# Patient Record
Sex: FEMALE | Race: WHITE | NOT HISPANIC OR LATINO | Employment: OTHER | URBAN - METROPOLITAN AREA
[De-identification: names, ages, dates, MRNs, and addresses within clinical notes are randomized per-mention and may not be internally consistent; named-entity substitution may affect disease eponyms.]

---

## 2024-05-20 ENCOUNTER — APPOINTMENT (OUTPATIENT)
Dept: AUDIOLOGY | Facility: CLINIC | Age: 69
End: 2024-05-20
Payer: COMMERCIAL

## 2024-06-10 ENCOUNTER — CLINICAL SUPPORT (OUTPATIENT)
Dept: AUDIOLOGY | Facility: CLINIC | Age: 69
End: 2024-06-10
Payer: COMMERCIAL

## 2024-06-10 DIAGNOSIS — H90.3 ASYMMETRICAL SENSORINEURAL HEARING LOSS: Primary | ICD-10-CM

## 2024-06-10 PROCEDURE — HRANC PR HEARING AID NO CHARGE: Performed by: AUDIOLOGIST

## 2024-06-10 NOTE — PROGRESS NOTES
"  AUDIOLOGY ADULT AUDIOMETRIC EVALUATION    Name:  Yamila Preciado  :  1955  Age:  69 y.o.  Date of Evaluation:  Lori 10, 2024    Reason for visit: Ms. Preciado is seen in the clinic today for an audiologic evaluation.     HISTORY  The patient is being seen for a [facility] consult.  She reported that she has noticed a decrease in hearing in her left ear for years.  She is finding it difficult to understand speech when she is in background noise.  She is not sure why her left ear is worse than her right ear, and she has never been seen for an evaluation by an otolaryngologist.  Otalgia, aural pressure and tinnitus were denied.  She has occasional \"dizzy spells\".      EVALUATION  See scanned audiogram: “Media” > “Audiology Report”.      RESULTS  Otoscopic Evaluation:  Right Ear: clear ear canal  Left Ear: clear ear canal    Immittance Measures:  Tympanometry:  Right Ear: Type A, normal tympanic membrane mobility with normal middle ear pressure   Left Ear: Type A, normal tympanic membrane mobility with normal middle ear pressure     Acoustic Reflexes:  Ipsilateral Right Ear: did not evaluate   Ipsilateral Left Ear: did not evaluate   Contralateral Right Ear: did not evaluate  Contralateral Left Ear: did not evaluate    Distortion Product Otoacoustic Emissions (DPOAEs):  Right Ear: did not evaluate   Left Ear: did not evaluate     Audiometry:  Test Technique and Reliability:   Standard audiometry via supra-aural headphones. Reliability is good.    Pure tone air and bone conduction audiometry:  Right Ear: mild sensorineural hearing loss at 8969-3644 Hz  Left Ear: mild to moderate sensorineural hearing loss     Speech Audiometry (Word Recognition Scores):   Right Ear: Excellent, 100%   Left Ear: Excellent, 96%     IMPRESSIONS  Results of today's audiometric evaluation revealed an asymmetrical sensorineural hearing loss (left ear worse than right ear).     RECOMMENDATIONS  - Follow up with otolaryngology regarding " asymmetry.  - Audiologic evaluation in conjunction with otologic care, if an acute change is noted, and/or annually.  - Hearing aid evaluation.  - Follow-up with medical care team as planned.    PATIENT EDUCATION  Discussed results, impressions and recommendations with the patient. Questions were addressed and the patient was encouraged to contact our office should concerns arise.    Time for this encounter: 1:00-1:30    Nhi Aleman M.A., CCC-A   Licensed Audiologist

## 2024-06-10 NOTE — PROGRESS NOTES
HEARING AID EVALUATION/[facility] CONSULT    The patient was seen today for a [facility] consult.  She has never worn hearing aids, but she is having difficulty hearing in background noise.  Discussed the various amplification options with the patient, including the various styles of hearing aids and the differences in technology levels.  She decided upon ordering two [facility] ([employer]) advanced rechargeable RICs in the color yony brown with #1 S receivers.  Scheduled the fitting and follow up appointments.  The patient owns a [employer] N10 5G cell phone.  I will check to determine if it is compatible with [facility] hearing aids.      APPOINTMENT TIME: 1:30-2:30

## 2024-07-02 ENCOUNTER — APPOINTMENT (OUTPATIENT)
Dept: AUDIOLOGY | Facility: CLINIC | Age: 69
End: 2024-07-02
Payer: COMMERCIAL

## 2024-07-02 DIAGNOSIS — H90.3 ASYMMETRICAL SENSORINEURAL HEARING LOSS: Primary | ICD-10-CM

## 2024-07-02 NOTE — PROGRESS NOTES
HEARING AID FITTING- [facility] HEARING    RIGHT: [facility] HEARING 7 Li ADVANCED RECHARGEABLE NAYE WITH A #1S   AND LARGE OPEN DOME WITH NO RETENTION TAIL  S.N.: [id number]  LEFT: [facility] HEARING 7 Li ADVANCED RECHARGEABLE NAYE WITH A #3S   AND 12 MM TULIP DOME WITH NO RETENTION TAIL (WILL OBTAIN A #2S  FROM [place] AND DETERMINE IF IT FITS BETTER AT HER FOLLOW UP APPOINTMENT)  S.N.: [id number]  WARRANTY EXPIRES: 7/10/2027    Fit the patient with the above listed hearing aids set to first fit for a new user.  Discussed use and care of the hearing aids and practiced inserting the aids and adjusting the volume.  The right push button adjusts volume and the left upper button switches between universal and noisy environments programs.  Showed the patient how to replace the wax guards and had her return demonstrate.  At her next appointment we will see if the [facility] balbir will work with her cell phone.  In addition to today's verbal instruction of the hearing devices, the patient was given written instructions from the hearing aid . Hearing aid limitations were discussed at length as well as realistic expectations. The patient was advised in order to receive full benefit of amplification, consistent use during all waking hours is recommended.  The repair warranty and the conditions of the right-to-return period were discussed. The patient reports understanding of these conditions. The [facility] delivery receipt was signed (see scanned documents), and the aids were delivered in the [facility] Hearing portal.  Patient will return in 1 week for a hearing aid check.     Appointment time:  1:00-2:00

## 2024-07-09 ENCOUNTER — APPOINTMENT (OUTPATIENT)
Dept: AUDIOLOGY | Facility: CLINIC | Age: 69
End: 2024-07-09
Payer: COMMERCIAL

## 2024-07-09 DIAGNOSIS — H90.3 ASYMMETRICAL SENSORINEURAL HEARING LOSS: Primary | ICD-10-CM

## 2024-07-09 PROCEDURE — HRANC PR HEARING AID NO CHARGE: Performed by: AUDIOLOGIST

## 2024-07-09 NOTE — PROGRESS NOTES
HEARING AID CHECK- [facility]     RIGHT: [facility] HEARING 7 Li ADVANCED RECHARGEABLE NAYE WITH A #1S   AND MEDIUM OPEN DOME WITH NO RETENTION TAIL  S.N.: [id number]  LEFT: [facility] HEARING 7 Li ADVANCED RECHARGEABLE NAYE WITH A #3S   AND 12 MM TULIP DOME WITH NO RETENTION TAIL (WILL OBTAIN A #2S  FROM [place] AND DETERMINE IF IT FITS BETTER AT HER FOLLOW UP APPOINTMENT)  S.N.: [id number]  WARRANTY EXPIRES: 7/10/2027     The patient is doing well with her hearing aids and wearing them approximately 4-5 hours per day on average.  Encouraged her to wear her aids at least 8 hours per day.  Replaced the large open dome on the right hearing aid with a medium open dome for better comfort.  Reviewed insertion of the aids and reminded her to make sure the  wires are flush with her cheeks.  Raised the level to first fit for an experienced user.  The patient is not interested in using the [facility] balbir (if in fact it would work with her current cell phone).  She would just like to make the adjustments using the push buttons.  Follow up in two weeks for speechmapping.     Appointment time:  11:30-12:00

## 2024-07-23 ENCOUNTER — APPOINTMENT (OUTPATIENT)
Dept: AUDIOLOGY | Facility: CLINIC | Age: 69
End: 2024-07-23
Payer: COMMERCIAL

## 2024-07-23 DIAGNOSIS — H90.3 ASYMMETRICAL SENSORINEURAL HEARING LOSS: Primary | ICD-10-CM

## 2024-07-23 PROCEDURE — HRANC PR HEARING AID NO CHARGE: Performed by: AUDIOLOGIST

## 2024-07-23 NOTE — PROGRESS NOTES
HEARING AID CHECK- [facility]     RIGHT: [facility] HEARING 7 Li ADVANCED RECHARGEABLE NAYE WITH A #1S   AND MEDIUM OPEN DOME WITH NO RETENTION TAIL  S.N.: [id number]  LEFT: [facility] HEARING 7 Li ADVANCED RECHARGEABLE NAYE WITH A #2S   AND SMALL TULIP DOME WITH NO RETENTION TAIL  S.N.: [id number]  WARRANTY EXPIRES: 7/10/2027     The patient is doing well with her hearing aids and wearing them approximately 11 hours per day on average. Replaced the left  with a #2S  and replaced the large tulip dome with a small tulip dome.  Reviewed insertion of both aids and reminded her to make sure the  wires are flush with her cheeks.  Performed speechmapping and increased the gain to approximate her targets for moderate speech inputs.  Reviewed how and when to clean the aids and replace the wax guards.  Gave the patient replacement domes.  Also reviewed the function of both push buttons.  Recall in one year or sooner if needed.       Appointment time:  11:30-12:20

## 2024-10-01 ENCOUNTER — APPOINTMENT (OUTPATIENT)
Dept: OTOLARYNGOLOGY | Facility: CLINIC | Age: 69
End: 2024-10-01
Payer: COMMERCIAL

## 2025-01-14 ENCOUNTER — APPOINTMENT (OUTPATIENT)
Dept: OTOLARYNGOLOGY | Facility: CLINIC | Age: 70
End: 2025-01-14
Payer: COMMERCIAL

## 2025-01-14 VITALS — BODY MASS INDEX: 32.28 KG/M2 | WEIGHT: 171 LBS | HEIGHT: 61 IN

## 2025-01-14 DIAGNOSIS — H90.3 ASYMMETRIC SENSORINEURAL DEAFNESS: Primary | ICD-10-CM

## 2025-01-14 DIAGNOSIS — H93.13 TINNITUS OF BOTH EARS: ICD-10-CM

## 2025-01-14 DIAGNOSIS — H90.3 ASNHL (ASYMMETRICAL SENSORINEURAL HEARING LOSS): ICD-10-CM

## 2025-01-14 DIAGNOSIS — H90.3 SENSORINEURAL HEARING LOSS (SNHL) OF BOTH EARS: ICD-10-CM

## 2025-01-14 PROCEDURE — 99204 OFFICE O/P NEW MOD 45 MIN: CPT | Performed by: OTOLARYNGOLOGY

## 2025-01-14 PROCEDURE — 1159F MED LIST DOCD IN RCRD: CPT | Performed by: OTOLARYNGOLOGY

## 2025-01-14 PROCEDURE — 1036F TOBACCO NON-USER: CPT | Performed by: OTOLARYNGOLOGY

## 2025-01-14 PROCEDURE — 3008F BODY MASS INDEX DOCD: CPT | Performed by: OTOLARYNGOLOGY

## 2025-01-14 RX ORDER — LISINOPRIL 20 MG/1
20 TABLET ORAL DAILY
COMMUNITY

## 2025-01-14 RX ORDER — AMLODIPINE BESYLATE 10 MG/1
10 TABLET ORAL DAILY
COMMUNITY

## 2025-01-14 NOTE — PROGRESS NOTES
"Chief Complaint   Patient presents with    Hearing Problem     NP- HEARING CHECK, HAD AUDIO DONE 6/2024      Date of Evaluation: 1/14/2025   HPI  Yamila Preciado is a 69 y.o. female here for evaluation of asymmetric sensorineural hearing loss.  For a few years she has been aware that her left ear is not hearing as well as the right.  She denies any significant noise exposure.  No family history of hearing loss.  No dizziness.  She does have some tinnitus.  She now has hearing aids that are working well for her.         Past Medical History:   Diagnosis Date    Hypertension     Personal history of diseases of the skin and subcutaneous tissue 03/09/2016    History of urticaria      History reviewed. No pertinent surgical history.       Medications:   Current Outpatient Medications   Medication Instructions    amLODIPine (NORVASC) 10 mg, oral, Daily    lisinopril 20 mg, Daily        Allergies:  No Known Allergies     Physical Exam:  Last Recorded Vitals  Height 1.549 m (5' 1\"), weight 77.6 kg (171 lb). , Body mass index is 32.31 kg/m².  []General appearance: Well-developed, well-nourished in no acute distress, conversant with normal voice quality    Head/face: No erythema or edema or facial tenderness, and normal facial nerve function bilaterally    External ear: Clear external auditory canals with normal pinnae  Tube status: N/A  Middle ear: Tympanic membranes intact and mobile, middle ears normal.  Tympanic membrane perforation: N/A  Mastoid bowl: N/A  Hearing: Normal conversational awareness at normal speech thresholds    Nose visualized using: Anterior rhinoscopy  Nasal dorsum: Nontraumatic midline appearance  Septum: Midline, nonobstructing  Inferior turbinates: Normal, pink  Secretions: Dry    Oral cavity and oropharynx: Normal  Teeth: Good condition  Floor of mouth: without lesions  Palate: Normal hard palate, soft palate and uvula  Oropharynx: Clear, no lesions present  Buccal mucosa: Normal without masses or " lesions  Lips: Normal    Nasopharynx: Inadequate mirror exam secondary to gag/anatomy    Neck:  Salivary glands: Normal bilateral parotid and submandibular glands by inspection and palpation.  Non-thyroid masses: No palpable masses or significant lymphadenopathy  Trachea: Midline  Thyroid: No thyromegaly or palpable nodules  Temporomandibular joint: Nontender  Cervical range of motion: Normal    Neurologic exam: Alert and oriented x3, appropriate affect.  Cranial nerves II-XII normal bilaterally  Extraocular movement: Extraocular movement intact, normal gaze alignment        Yamila was seen today for hearing problem.  Diagnoses and all orders for this visit:  Asymmetric sensorineural deafness (Primary)  Sensorineural hearing loss (SNHL) of both ears  Tinnitus of both ears  ASNHL (asymmetrical sensorineural hearing loss)  -     MR IAC w and wo IV contrast; Future       PLAN  She has unexplained left asymmetric sensorineural hearing loss.  She is doing well with hearing aids.  I would like to check an MRI to evaluate for retrocochlear pathology.  She will call me for results.  Recheck in 1 year with an audiogram    Tone French MD

## 2025-01-28 ENCOUNTER — HOSPITAL ENCOUNTER (OUTPATIENT)
Dept: RADIOLOGY | Facility: HOSPITAL | Age: 70
Discharge: HOME | End: 2025-01-28
Payer: MEDICARE

## 2025-01-28 DIAGNOSIS — H90.3 ASNHL (ASYMMETRICAL SENSORINEURAL HEARING LOSS): ICD-10-CM

## 2025-01-28 PROCEDURE — A9575 INJ GADOTERATE MEGLUMI 0.1ML: HCPCS | Performed by: OTOLARYNGOLOGY

## 2025-01-28 PROCEDURE — 2550000001 HC RX 255 CONTRASTS: Performed by: OTOLARYNGOLOGY

## 2025-01-28 PROCEDURE — 70553 MRI BRAIN STEM W/O & W/DYE: CPT

## 2025-01-28 PROCEDURE — 70553 MRI BRAIN STEM W/O & W/DYE: CPT | Performed by: RADIOLOGY

## 2025-01-28 RX ORDER — GADOTERATE MEGLUMINE 376.9 MG/ML
15 INJECTION INTRAVENOUS
Status: COMPLETED | OUTPATIENT
Start: 2025-01-28 | End: 2025-01-28

## 2025-01-28 RX ADMIN — GADOTERATE MEGLUMINE 15 ML: 376.9 INJECTION INTRAVENOUS at 15:38

## 2025-01-29 NOTE — RESULT ENCOUNTER NOTE
Please call the patient regarding her MRI.  Normal MRI of the head except for age-related changes.  Proceed with hearing aid eval

## 2025-07-30 ENCOUNTER — NURSE TRIAGE (OUTPATIENT)
Dept: AUDIOLOGY | Facility: CLINIC | Age: 70
End: 2025-07-30
Payer: MEDICARE

## 2025-07-30 NOTE — TELEPHONE ENCOUNTER
Initiate Call notes copied by Sandra Rodney on Wednesday July 30, 2025  3:41 PM  ------  Documentation by Sandra Rodney. [[id number]] 7/29/2025  2:18 PM  [facility]

## 2025-10-13 ENCOUNTER — APPOINTMENT (OUTPATIENT)
Dept: AUDIOLOGY | Facility: CLINIC | Age: 70
End: 2025-10-13
Payer: MEDICARE

## 2025-10-13 ENCOUNTER — APPOINTMENT (OUTPATIENT)
Dept: AUDIOLOGY | Facility: CLINIC | Age: 70
End: 2025-10-13